# Patient Record
(demographics unavailable — no encounter records)

---

## 2025-01-17 NOTE — HISTORY OF PRESENT ILLNESS
[7] : 7 [0] : 0 [Dull/Aching] : dull/aching [Sharp] : sharp [Rest] : rest [Retired] : Work status: retired [] : Post Surgical Visit: no [FreeTextEntry5] : left knee weakness/pain for around 4 months. pt also has a hx of lower back injury and sx. pt states he has shooting pains from the lower back into his knee.  [de-identified] : none

## 2025-01-17 NOTE — ASSESSMENT
[FreeTextEntry1] : The patient is a 67 yo man presenting with left knee pain of chronic severity. He denies injury. The patient has pain with ADLs. He has trouble standing up from kneeling on the floor. He has pain with walking distances. He has not had buckling. He has intermittent pain at night. The patient has paresthesias due to a significant lumbar spine history. He has tried advil as needed with good relief. His pain is medial and anterior to the knee. Advil 800 mg occasionally takes the edge off.  Left knee exam: Well appearing male in no apparent distress. No rashes, scars, or abrasions. Neurovascularly intact. Tender to palpation at medial and anterior knee. There is a trace effusion. Ranging from 0-120. No deformity. Anterior/posterior drawer negative, - Mcmurrays. - Lachmans. Screening exam of the left hip shows a full, painless ROM.  X-rays in the office today of the left knee 4 views weightbearing show minimal degenerative changes with no narrowing.  There are no obvious tumors, mass or calcifications seen.  The plan at this time is to get an MRI of the left knee to rule out a meniscal tear.  He will see Dr. Adame his spine doctor for the persistent lumbar    pain with sciatica.

## 2025-01-17 NOTE — HISTORY OF PRESENT ILLNESS
[7] : 7 [0] : 0 [Dull/Aching] : dull/aching [Sharp] : sharp [Rest] : rest [Retired] : Work status: retired [] : Post Surgical Visit: no [FreeTextEntry5] : left knee weakness/pain for around 4 months. pt also has a hx of lower back injury and sx. pt states he has shooting pains from the lower back into his knee.  [de-identified] : none

## 2025-01-17 NOTE — ASSESSMENT
[FreeTextEntry1] : The patient is a 65 yo man presenting with left knee pain of chronic severity. He denies injury. The patient has pain with ADLs. He has trouble standing up from kneeling on the floor. He has pain with walking distances. He has not had buckling. He has intermittent pain at night. The patient has paresthesias due to a significant lumbar spine history. He has tried advil as needed with good relief. His pain is medial and anterior to the knee. Advil 800 mg occasionally takes the edge off.  Left knee exam: Well appearing male in no apparent distress. No rashes, scars, or abrasions. Neurovascularly intact. Tender to palpation at medial and anterior knee. There is a trace effusion. Ranging from 0-120. No deformity. Anterior/posterior drawer negative, - Mcmurrays. - Lachmans. Screening exam of the left hip shows a full, painless ROM.  X-rays in the office today of the left knee 4 views weightbearing show minimal degenerative changes with no narrowing.  There are no obvious tumors, mass or calcifications seen.  The plan at this time is to get an MRI of the left knee to rule out a meniscal tear.  He will see Dr. Adame his spine doctor for the persistent lumbar    pain with sciatica.

## 2025-01-17 NOTE — HISTORY OF PRESENT ILLNESS
[7] : 7 [0] : 0 [Dull/Aching] : dull/aching [Sharp] : sharp [Rest] : rest [Retired] : Work status: retired [] : Post Surgical Visit: no [FreeTextEntry5] : left knee weakness/pain for around 4 months. pt also has a hx of lower back injury and sx. pt states he has shooting pains from the lower back into his knee.  [de-identified] : none

## 2025-04-02 NOTE — HISTORY OF PRESENT ILLNESS
[FreeTextEntry1] : The patient is a pleasant 66 year old male diagnosed with Basal Cell carcinoma of the right cheek referred by Dr. Bashir.  He noted a nonhealing "pimple" on the right medial upper cheekbone which he shaved and kept recurring. He underwent cryotherapy x 2 and it recurred.  Biopsy on 3/20/25 demonstrated: Mid Right cheek 3 cm right to the nasal wing: Basal Cell Carcinoma. He is retired and lives in [place] with his wife Jaci. She has had many skin cancers including melanoma, and underwent RT to the inner R eye canthus so is familiar with radiotherapy. He is not interested in Mohs. His son was diagnosed 2 years ago at age 34 with mycosis fungoides of the inner thighs and is currently ADRIANO. He presents with his wife to discuss the role of radiation therapy in her care.

## 2025-04-02 NOTE — VITALS
[Maximal Pain Intensity: 0/10] : 0/10 [90: Able to carry normal activity; minor signs or symptoms of disease.] : 90: Able to carry normal activity; minor signs or symptoms of disease.  [Date: ____________] : Patient's last distress assessment performed on [unfilled]. [0 - No Distress] : Distress Level: 0

## 2025-04-02 NOTE — HISTORY OF PRESENT ILLNESS
[FreeTextEntry1] : The patient is a pleasant 66 year old male diagnosed with Basal Cell carcinoma of the right cheek referred by Dr. Bashir.  He noted a nonhealing "pimple" on the right medial upper cheekbone which he shaved and kept recurring. He underwent cryotherapy x 2 and it recurred.  Biopsy on 3/20/25 demonstrated: Mid Right cheek 3 cm right to the nasal wing: Basal Cell Carcinoma. He is retired and lives in [place] with his wife Jaci. She has had many skin cancers including melanoma, and underwent RT to the inner R eye canthus so is familiar with radiotherapy. He is not interested in Mohs. His son was diagnosed 2 years ago at age 34 with mycosis fungoides of the inner thighs and is currently ADRIANO. He presents with his wife to discuss the role of radiation therapy in her care.

## 2025-04-02 NOTE — PHYSICAL EXAM
[Normal] : supple with no thyromegaly or masses appreciated [Cervical Lymph Nodes Enlarged Posterior Bilaterally] : posterior cervical [Cervical Lymph Nodes Enlarged Anterior Bilaterally] : anterior cervical [de-identified] : scaling lesion R medial cheek with red pimple lateral to it

## 2025-04-02 NOTE — PHYSICAL EXAM
[Normal] : supple with no thyromegaly or masses appreciated [Cervical Lymph Nodes Enlarged Posterior Bilaterally] : posterior cervical [Cervical Lymph Nodes Enlarged Anterior Bilaterally] : anterior cervical [de-identified] : scaling lesion R medial cheek with red pimple lateral to it

## 2025-06-12 NOTE — HISTORY OF PRESENT ILLNESS
[FreeTextEntry1] : The patient is a pleasant 66 year old male diagnosed with Basal Cell carcinoma of the right cheek referred by Dr. Bashir.  He noted a nonhealing "pimple" on the right medial upper cheekbone which he shaved and kept recurring. He underwent cryotherapy x 2 and it recurred.  Biopsy on 3/20/25 demonstrated: Mid Right cheek 3 cm right to the nasal wing: Basal Cell Carcinoma. He is retired and lives in [place] with his wife Jaci. She has had many skin cancers including melanoma, and underwent RT to the inner R eye canthus so is familiar with radiotherapy. He is not interested in Mohs. His son was diagnosed 2 years ago at age 34 with mycosis fungoides of the inner thighs and is currently ADRIANO. He presents with his wife to discuss the role of radiation therapy in her care.  6/12/25 Patient presents for 1 month PTE. Completed RT to the right cheek with 4000 cGy in 8 fractions on 5/9/25. The right upper cheek has residual hyperpigmentation, but skin healed completely. He was using Aquaphor. He has not been using sunblock recently but has been covering the spot with a band aid on deandre days. Patient has a mole next to the treated spot which become darker after RT. He has not seen the dermatologist after his treatment yet.